# Patient Record
Sex: FEMALE | Race: WHITE | NOT HISPANIC OR LATINO | Employment: FULL TIME | URBAN - METROPOLITAN AREA
[De-identification: names, ages, dates, MRNs, and addresses within clinical notes are randomized per-mention and may not be internally consistent; named-entity substitution may affect disease eponyms.]

---

## 2021-11-09 PROCEDURE — U0004 COV-19 TEST NON-CDC HGH THRU: HCPCS | Performed by: NURSE PRACTITIONER

## 2023-09-22 PROBLEM — J40 BRONCHITIS: Status: ACTIVE | Noted: 2017-06-27

## 2025-01-30 ENCOUNTER — OFFICE VISIT (OUTPATIENT)
Dept: FAMILY MEDICINE CLINIC | Facility: CLINIC | Age: 60
End: 2025-01-30
Payer: COMMERCIAL

## 2025-01-30 VITALS
HEIGHT: 61 IN | OXYGEN SATURATION: 97 % | WEIGHT: 124 LBS | TEMPERATURE: 99.2 F | DIASTOLIC BLOOD PRESSURE: 73 MMHG | SYSTOLIC BLOOD PRESSURE: 108 MMHG | HEART RATE: 89 BPM | RESPIRATION RATE: 18 BRPM | BODY MASS INDEX: 23.41 KG/M2

## 2025-01-30 DIAGNOSIS — B00.9 HERPES SIMPLEX TYPE 2 INFECTION: ICD-10-CM

## 2025-01-30 DIAGNOSIS — F41.8 DEPRESSION WITH ANXIETY: ICD-10-CM

## 2025-01-30 DIAGNOSIS — B02.9 HERPES ZOSTER WITHOUT COMPLICATION: Primary | ICD-10-CM

## 2025-01-30 PROBLEM — Z78.0 OSTEOPENIA AFTER MENOPAUSE: Status: ACTIVE | Noted: 2025-01-30

## 2025-01-30 PROBLEM — C44.311 BASAL CELL CARCINOMA (BCC) OF SUPRATIP OF NOSE: Status: ACTIVE | Noted: 2025-01-30

## 2025-01-30 PROBLEM — M85.80 OSTEOPENIA AFTER MENOPAUSE: Status: ACTIVE | Noted: 2025-01-30

## 2025-01-30 PROCEDURE — 99214 OFFICE O/P EST MOD 30 MIN: CPT | Performed by: REGISTERED NURSE

## 2025-01-30 RX ORDER — ACYCLOVIR 800 MG/1
800 TABLET ORAL 3 TIMES DAILY
Qty: 21 TABLET | Refills: 0 | Status: SHIPPED | OUTPATIENT
Start: 2025-01-30

## 2025-01-30 RX ORDER — VALACYCLOVIR HYDROCHLORIDE 500 MG/1
500 TABLET, FILM COATED ORAL DAILY
COMMUNITY

## 2025-01-30 RX ORDER — ESCITALOPRAM OXALATE 20 MG/1
20 TABLET ORAL DAILY
Qty: 90 TABLET | Refills: 1 | Status: SHIPPED | OUTPATIENT
Start: 2025-01-30

## 2025-01-30 RX ORDER — HYDROXYZINE HYDROCHLORIDE 10 MG/1
10 TABLET, FILM COATED ORAL EVERY 8 HOURS PRN
Qty: 30 TABLET | Refills: 0 | Status: SHIPPED | OUTPATIENT
Start: 2025-01-30

## 2025-01-30 NOTE — PROGRESS NOTES
Subjective        Dianelys Goldman is a 59 y.o. female who presents to [facility].     Chief Complaint   Patient presents with    Rash     Under right armpit     Establish Care       Rash        Pt is new to me and new to this practice. Requesting me as PCP.  Today she also has a C/O rash on lateral right breast..    Rash - Started on 1/27 evening. It has stayed the same since then she says. She says it is painful and itches. She denies any drainage but says she believes there are blisters forming. She has never had anything like this before. Cortizone cream stops the itching but not the pain. Her bra rubs against it and makes it worse. Pain feels like burning. She did not notice the rash until it started itching 3 days ago.  Singles - Positive for low grade fever and herpetic rash.  Depression with anxiety - Chronic/stable. Pt denies any SI, HI, sleep disturbance, anxiety, emotional episodes.  HSV2 - Chronic/stable - She says she takes Valtrex when she feels a cold sore starting to form.    Dr. Gutierrez [facility] has ordered her DEXA scan and scripts in the past.    The following portions of the patient's history were reviewed and updated as appropriate: allergies, current medications, past family history, past medical history, past social history, past surgical history and problem list.    No Known Allergies       Current Outpatient Medications:     cyclobenzaprine (FLEXERIL) 10 MG tablet, Take 1 tablet by mouth At Night As Needed for Muscle Spasms (pain)., Disp: 15 tablet, Rfl: 0    escitalopram (LEXAPRO) 20 MG tablet, Take 1 tablet by mouth Daily., Disp: 90 tablet, Rfl: 1    valACYclovir (VALTREX) 500 MG tablet, Take 1 tablet by mouth Daily. (Patient taking differently: Take 1 tablet by mouth Daily. Take daily PRN when feeling a flare-up start.), Disp: , Rfl:     acyclovir (ZOVIRAX) 800 MG tablet, Take 1 tablet by mouth 3 (Three) Times a Day., Disp: 21 tablet, Rfl: 0    hydrOXYzine  "(ATARAX) 10 MG tablet, Take 1 tablet by mouth Every 8 (Eight) Hours As Needed for Itching., Disp: 30 tablet, Rfl: 0    Review of Systems   Skin:  Positive for rash.        Objective     /73 (BP Location: Left arm, Patient Position: Sitting, Cuff Size: Adult)   Pulse 89   Temp 99.2 °F (37.3 °C) (Oral)   Resp 18   Ht 154.9 cm (60.98\")   Wt 56.2 kg (124 lb)   SpO2 97%   BMI 23.44 kg/m²   BMI is within normal parameters. No other follow-up for BMI required.     Physical Exam  Vitals reviewed.   Constitutional:       General: She is not in acute distress.     Appearance: Normal appearance. She is normal weight. She is not ill-appearing.   Eyes:      Conjunctiva/sclera: Conjunctivae normal.   Cardiovascular:      Rate and Rhythm: Normal rate and regular rhythm.      Pulses: Normal pulses.      Heart sounds: Normal heart sounds.   Pulmonary:      Effort: Pulmonary effort is normal.      Breath sounds: Normal breath sounds.   Skin:     General: Skin is warm and dry.      Findings: Rash present. Rash is macular, papular and purpuric.             Comments: Erythematous macular area 5 cm x 3 cm with cluster of papules along dermatome. Skin is intact, no drainage noted.   Neurological:      General: No focal deficit present.      Mental Status: She is alert and oriented to person, place, and time.   Psychiatric:         Mood and Affect: Mood normal.         Behavior: Behavior normal.         Thought Content: Thought content normal.         Judgment: Judgment normal.         PHQ-2 Depression Screening  Little interest or pleasure in doing things? Not at all   Feeling down, depressed, or hopeless? Not at all   PHQ-2 Total Score 0         Result Review    The following data was reviewed by: MARKO Medina on 01/30/2025:               Assessment & Plan    Diagnoses and all orders for this visit:    1. Herpes zoster without complication (Primary)  -     acyclovir (ZOVIRAX) 800 MG tablet; Take 1 tablet by mouth 3 " (Three) Times a Day.  Dispense: 21 tablet; Refill: 0  -     hydrOXYzine (ATARAX) 10 MG tablet; Take 1 tablet by mouth Every 8 (Eight) Hours As Needed for Itching.  Dispense: 30 tablet; Refill: 0    2. Depression with anxiety  -     escitalopram (LEXAPRO) 20 MG tablet; Take 1 tablet by mouth Daily.  Dispense: 90 tablet; Refill: 1    3. Herpes simplex type 2 infection       Patient Instructions   Call your pharmacy for refills within 7 days of needing medication.     Do not take Valtrex while taking acyclovir.     Return in about 2 weeks for New Patient Physical.    Please call the office if you have any questions or concerns.    Thank you,  RAÚL Kim    Office number:   [phone]          Shingles, or herpes zoster, is an infection. It gives you a skin rash and blisters. These infected areas may hurt a lot.  Shingles only happens if:  You've had chickenpox.  You've been given a shot called a vaccine to protect you from getting chickenpox. Shingles is rare in this case.  What are the causes?  Shingles is caused by a germ called the varicella-zoster virus. This is the same germ that causes chickenpox. After you're exposed to the germ, it stays in your body but is dormant. This means it isn't active.  Shingles happens if the germ becomes active again. This can happen years after you're first exposed to the germ.  What increases the risk?  You may be more likely to get shingles if:  You're older than 60 years of age.  You're under a lot of stress.  You have a weak immune system. The immune system is your body's defense system. It may be weak if:  You have human immunodeficiency virus (HIV).  You have acquired immunodeficiency syndrome (AIDS).  You have cancer.  You take medicines that weaken your immune system. These include organ transplant medicines.  What are the signs or symptoms?  The first symptoms of shingles may be itching, tingling, or pain. Your skin may feel like it's burning.  A few days or  weeks later, you'll get a rash. Here's what you can expect:  The rash is likely to be on one side of your body.  The rash may be shaped like a belt or a band. Over time, it will turn into blisters filled with fluid.  The blisters will break open and change into scabs.  The scabs will dry up in about 2-3 weeks.  You may also have:  A fever.  Chills.  A headache.  Nausea.  How is this diagnosed?  Shingles is diagnosed with a skin exam. A sample called a culture may be taken from one of your blisters and sent to a lab. This will show if you have shingles.  How is this treated?  The rash may last for several weeks. There's no cure for shingles, but your health care provider may give you medicines. These medicines may:  Help with pain.  Help with itching.  Help with irritation and swelling.  Help you get better sooner.  Help to prevent long-term problems.  If the rash is on your face, you may need to see an eye doctor or an ear, nose, and throat (ENT) doctor.  Follow these instructions at home:  Medicines  Take your medicines only as told by your provider.  Put an anti-itch cream or numbing cream on the rash or blisters as told by your provider.  Relieving itching and discomfort    To help with itching:  Put cold, wet cloths called cold compresses on the rash or blisters.  Take a cool bath. Try adding baking soda or dry oatmeal to the water. Do not bathe in hot water.  Use calamine lotion on the rash or blisters. You can get this type of lotion at the store.  Blister and rash care  Keep your rash covered with a loose bandage.  Wear loose clothes that don't rub on your rash.  Take care of your rash as told by your provider. Make sure you:  Wash your hands with soap and water for at least 20 seconds before and after you change your bandage. If you can't use soap and water, use hand .  Keep your rash and blisters clean by washing them with mild soap and cool water.  Change your bandage.  Check your rash every day  for signs of infection. Check for:  More redness, swelling, or pain.  Fluid or blood.  Warmth.  Pus or a bad smell.  Do not scratch your rash. Do not pick at your blisters. To help you not scratch:  Keep your fingernails clean and cut short.  Try to wear gloves or mittens when you sleep.  General instructions  Rest.  Wash your hands often with soap and water for at least 20 seconds. If you can't use soap and water, use hand . Washing your hands lowers your chance of getting a skin infection.  Your infection can cause chickenpox in others. If you have blisters that aren't scabs yet, stay away from:  Babies.  Pregnant people.  Children who have eczema.  Older people who have organ transplants.  People who have a long-term, or chronic, illness.  Anyone who hasn't had chickenpox before.  Anyone who hasn't gotten the chickenpox vaccine.  How is this prevented?  Vaccines are the best way to prevent you from getting chickenpox or shingles. Talk with your provider about getting these shots.  Where to find more information  Centers for Disease Control and Prevention (CDC): cdc.gov  Contact a health care provider if:  Your pain doesn't get better with medicine.  Your pain doesn't get better after the rash heals.  You have any signs of infection around the rash.  Your rash or blisters get worse.  You have a fever or chills.  Get help right away if:  The rash is on your face or nose.  You have pain in your face or by your eye.  You lose feeling on one side of your face.  You have trouble seeing.  You have ear pain or ringing in your ear.  This information is not intended to replace advice given to you by your health care provider. Make sure you discuss any questions you have with your health care provider.  Document Revised: 09/19/2024 Document Reviewed: 02/02/2024  Elsevier Patient Education © 2024 [employer]       Nerve Pain After Shingles  Postherpetic neuralgia (PHN) is nerve pain you may get after you have  shingles. Shingles is an infection that causes a painful rash and blisters. It's caused by the same germ that causes chickenpox.  PHN affects the spot on your body where you had the shingles rash. It can last for 3 months after your rash has gone away.  What are the causes?  PHN may be caused by damage to your nerves. This damage may come from swelling from the shingles infection.  What increases the risk?  You may be more likely to get PHN if:  You're older than 60 years of age.  You have severe pain before your rash starts.  You have a very bad rash.  You have shingles in and around your eye.  Your body defense system (immune system) is weak.  What are the signs or symptoms?  The main symptom of PHN is pain. The pain may:  Be stabbing, burning, or shooting.  Feel like an electric shock.  Come and go, or it may be there all the time.  Get worse if:  Something touches your skin.  The temperature goes up or down.  You may also have itching.  How is this diagnosed?  PHN may be diagnosed based on:  Your symptoms.  Whether you've had shingles before.  How is this treated?  There's no cure, but treatment can help with the pain. Normal pain medicines may not help. You may need to work with an expert in treating pain to find what works best for you. Treatment may include:  Anti-seizure medicines.  Antidepressants.  Strong pain medicines.  A numbing patch worn on the skin.  Shots of:  Numbing medicines.  Medicines to treat inflammation.  Botulinum toxin. This can block pain signals and stop you from feeling pain.  Follow these instructions at home:  Medicines  Take over-the-counter and prescription medicines only as told by your health care provider.  Ask your provider if the medicine prescribed to you:  Requires you to avoid driving or using machinery.  Can cause trouble pooping or constipation. You may need to take these actions to prevent or treat trouble pooping:  Drink enough fluid to keep your pee (urine) pale  yellow.  Take over-the-counter or prescription medicines.  Eat foods that are high in fiber, such as beans, whole grains, and fresh fruits and vegetables.  Limit foods that are high in fat and processed sugars, such as fried or sweet foods.  Managing pain    If told, put ice on the painful area.  Put ice in a plastic bag.  Place a towel between your skin and the bag.  Leave the ice on for 20 minutes, 2-3 times a day.  If your skin turns bright red, remove the ice right away to prevent skin damage. The risk of damage is higher if you can't feel pain, heat, or cold.  Cover sensitive spots with a bandage, or dressing, to stop clothes from rubbing.  Wear loose clothes.  General instructions  It may take a long time for you to get better. Work closely with your provider.  Think about talking with a mental health care provider. They can help you find ways to cope with feeling overwhelmed or hopeless.  Have a good support system at home.  Think about joining a pain support group.  How is this prevented?  Vaccines are the best way to prevent shingles and PHN. You should get the vaccine shot for shingles once you're older than 50 years of age. Talk with your provider about getting the shot.  Contact a health care provider if:  Your medicine isn't helping.  You can't manage your pain at home.  You feel sad or depressed.  Get help right away if:  You have thoughts about hurting yourself or others.  Get help right away if you feel like you may hurt yourself or others, or have thoughts about taking your own life. Go to your nearest emergency room or:  Call 911.  Call the National Suicide Prevention Lifeline at 1-863.826.4370 or 703. This is open 24 hours a day.  Text the Crisis Text Line at 876194.  This information is not intended to replace advice given to you by your health care provider. Make sure you discuss any questions you have with your health care provider.  Document Revised: 03/21/2024 Document Reviewed:  03/21/2024  Elsevier Patient Education © 2024 [employer]         Follow Up   Return in about 2 weeks (around 2/13/2025) for Annual physical.    Patient was given instructions and counseling regarding her condition or for health maintenance advice. Please see specific information pulled into the AVS if appropriate.     Sangeetha Rose, APRN     01/30/25

## 2025-01-30 NOTE — PATIENT INSTRUCTIONS
Call your pharmacy for refills within 7 days of needing medication.     Do not take Valtrex while taking acyclovir.     Return in about 2 weeks for New Patient Physical.    Please call the office if you have any questions or concerns.    Thank you,  RAÚL Kim    Office number:   [phone]          Shingles, or herpes zoster, is an infection. It gives you a skin rash and blisters. These infected areas may hurt a lot.  Shingles only happens if:  You've had chickenpox.  You've been given a shot called a vaccine to protect you from getting chickenpox. Shingles is rare in this case.  What are the causes?  Shingles is caused by a germ called the varicella-zoster virus. This is the same germ that causes chickenpox. After you're exposed to the germ, it stays in your body but is dormant. This means it isn't active.  Shingles happens if the germ becomes active again. This can happen years after you're first exposed to the germ.  What increases the risk?  You may be more likely to get shingles if:  You're older than 60 years of age.  You're under a lot of stress.  You have a weak immune system. The immune system is your body's defense system. It may be weak if:  You have human immunodeficiency virus (HIV).  You have acquired immunodeficiency syndrome (AIDS).  You have cancer.  You take medicines that weaken your immune system. These include organ transplant medicines.  What are the signs or symptoms?  The first symptoms of shingles may be itching, tingling, or pain. Your skin may feel like it's burning.  A few days or weeks later, you'll get a rash. Here's what you can expect:  The rash is likely to be on one side of your body.  The rash may be shaped like a belt or a band. Over time, it will turn into blisters filled with fluid.  The blisters will break open and change into scabs.  The scabs will dry up in about 2-3 weeks.  You may also have:  A fever.  Chills.  A headache.  Nausea.  How is this  diagnosed?  Shingles is diagnosed with a skin exam. A sample called a culture may be taken from one of your blisters and sent to a lab. This will show if you have shingles.  How is this treated?  The rash may last for several weeks. There's no cure for shingles, but your health care provider may give you medicines. These medicines may:  Help with pain.  Help with itching.  Help with irritation and swelling.  Help you get better sooner.  Help to prevent long-term problems.  If the rash is on your face, you may need to see an eye doctor or an ear, nose, and throat (ENT) doctor.  Follow these instructions at home:  Medicines  Take your medicines only as told by your provider.  Put an anti-itch cream or numbing cream on the rash or blisters as told by your provider.  Relieving itching and discomfort    To help with itching:  Put cold, wet cloths called cold compresses on the rash or blisters.  Take a cool bath. Try adding baking soda or dry oatmeal to the water. Do not bathe in hot water.  Use calamine lotion on the rash or blisters. You can get this type of lotion at the store.  Blister and rash care  Keep your rash covered with a loose bandage.  Wear loose clothes that don't rub on your rash.  Take care of your rash as told by your provider. Make sure you:  Wash your hands with soap and water for at least 20 seconds before and after you change your bandage. If you can't use soap and water, use hand .  Keep your rash and blisters clean by washing them with mild soap and cool water.  Change your bandage.  Check your rash every day for signs of infection. Check for:  More redness, swelling, or pain.  Fluid or blood.  Warmth.  Pus or a bad smell.  Do not scratch your rash. Do not pick at your blisters. To help you not scratch:  Keep your fingernails clean and cut short.  Try to wear gloves or mittens when you sleep.  General instructions  Rest.  Wash your hands often with soap and water for at least 20 seconds.  If you can't use soap and water, use hand . Washing your hands lowers your chance of getting a skin infection.  Your infection can cause chickenpox in others. If you have blisters that aren't scabs yet, stay away from:  Babies.  Pregnant people.  Children who have eczema.  Older people who have organ transplants.  People who have a long-term, or chronic, illness.  Anyone who hasn't had chickenpox before.  Anyone who hasn't gotten the chickenpox vaccine.  How is this prevented?  Vaccines are the best way to prevent you from getting chickenpox or shingles. Talk with your provider about getting these shots.  Where to find more information  Centers for Disease Control and Prevention (CDC): cdc.gov  Contact a health care provider if:  Your pain doesn't get better with medicine.  Your pain doesn't get better after the rash heals.  You have any signs of infection around the rash.  Your rash or blisters get worse.  You have a fever or chills.  Get help right away if:  The rash is on your face or nose.  You have pain in your face or by your eye.  You lose feeling on one side of your face.  You have trouble seeing.  You have ear pain or ringing in your ear.  This information is not intended to replace advice given to you by your health care provider. Make sure you discuss any questions you have with your health care provider.  Document Revised: 09/19/2024 Document Reviewed: 02/02/2024  Elsevier Patient Education © 2024 [employer]       Nerve Pain After Shingles  Postherpetic neuralgia (PHN) is nerve pain you may get after you have shingles. Shingles is an infection that causes a painful rash and blisters. It's caused by the same germ that causes chickenpox.  PHN affects the spot on your body where you had the shingles rash. It can last for 3 months after your rash has gone away.  What are the causes?  PHN may be caused by damage to your nerves. This damage may come from swelling from the shingles infection.  What  increases the risk?  You may be more likely to get PHN if:  You're older than 60 years of age.  You have severe pain before your rash starts.  You have a very bad rash.  You have shingles in and around your eye.  Your body defense system (immune system) is weak.  What are the signs or symptoms?  The main symptom of PHN is pain. The pain may:  Be stabbing, burning, or shooting.  Feel like an electric shock.  Come and go, or it may be there all the time.  Get worse if:  Something touches your skin.  The temperature goes up or down.  You may also have itching.  How is this diagnosed?  PHN may be diagnosed based on:  Your symptoms.  Whether you've had shingles before.  How is this treated?  There's no cure, but treatment can help with the pain. Normal pain medicines may not help. You may need to work with an expert in treating pain to find what works best for you. Treatment may include:  Anti-seizure medicines.  Antidepressants.  Strong pain medicines.  A numbing patch worn on the skin.  Shots of:  Numbing medicines.  Medicines to treat inflammation.  Botulinum toxin. This can block pain signals and stop you from feeling pain.  Follow these instructions at home:  Medicines  Take over-the-counter and prescription medicines only as told by your health care provider.  Ask your provider if the medicine prescribed to you:  Requires you to avoid driving or using machinery.  Can cause trouble pooping or constipation. You may need to take these actions to prevent or treat trouble pooping:  Drink enough fluid to keep your pee (urine) pale yellow.  Take over-the-counter or prescription medicines.  Eat foods that are high in fiber, such as beans, whole grains, and fresh fruits and vegetables.  Limit foods that are high in fat and processed sugars, such as fried or sweet foods.  Managing pain    If told, put ice on the painful area.  Put ice in a plastic bag.  Place a towel between your skin and the bag.  Leave the ice on for 20  minutes, 2-3 times a day.  If your skin turns bright red, remove the ice right away to prevent skin damage. The risk of damage is higher if you can't feel pain, heat, or cold.  Cover sensitive spots with a bandage, or dressing, to stop clothes from rubbing.  Wear loose clothes.  General instructions  It may take a long time for you to get better. Work closely with your provider.  Think about talking with a mental health care provider. They can help you find ways to cope with feeling overwhelmed or hopeless.  Have a good support system at home.  Think about joining a pain support group.  How is this prevented?  Vaccines are the best way to prevent shingles and PHN. You should get the vaccine shot for shingles once you're older than 50 years of age. Talk with your provider about getting the shot.  Contact a health care provider if:  Your medicine isn't helping.  You can't manage your pain at home.  You feel sad or depressed.  Get help right away if:  You have thoughts about hurting yourself or others.  Get help right away if you feel like you may hurt yourself or others, or have thoughts about taking your own life. Go to your nearest emergency room or:  Call 911.  Call the National Suicide Prevention Lifeline at 1-981.270.2082 or 901. This is open 24 hours a day.  Text the Crisis Text Line at 269924.  This information is not intended to replace advice given to you by your health care provider. Make sure you discuss any questions you have with your health care provider.  Document Revised: 03/21/2024 Document Reviewed: 03/21/2024  Elsevier Patient Education © 2024 [employer]

## 2025-02-14 ENCOUNTER — OFFICE VISIT (OUTPATIENT)
Dept: FAMILY MEDICINE CLINIC | Facility: CLINIC | Age: 60
End: 2025-02-14
Payer: COMMERCIAL

## 2025-02-14 VITALS
BODY MASS INDEX: 23.94 KG/M2 | HEIGHT: 61 IN | DIASTOLIC BLOOD PRESSURE: 73 MMHG | RESPIRATION RATE: 18 BRPM | OXYGEN SATURATION: 97 % | SYSTOLIC BLOOD PRESSURE: 109 MMHG | TEMPERATURE: 98.9 F | WEIGHT: 126.8 LBS | HEART RATE: 96 BPM

## 2025-02-14 DIAGNOSIS — Z00.00 ENCOUNTER FOR ANNUAL PHYSICAL EXAMINATION EXCLUDING GYNECOLOGICAL EXAMINATION IN A PATIENT OLDER THAN 17 YEARS: Primary | ICD-10-CM

## 2025-02-14 DIAGNOSIS — Z13.6 SCREENING FOR ISCHEMIC HEART DISEASE (IHD): ICD-10-CM

## 2025-02-14 DIAGNOSIS — H61.22 IMPACTED CERUMEN OF LEFT EAR: ICD-10-CM

## 2025-02-14 DIAGNOSIS — B00.9 HERPES SIMPLEX TYPE 2 INFECTION: ICD-10-CM

## 2025-02-14 DIAGNOSIS — Z13.6 SCREENING FOR CARDIOVASCULAR CONDITION: ICD-10-CM

## 2025-02-14 DIAGNOSIS — Z12.31 ENCOUNTER FOR SCREENING MAMMOGRAM FOR MALIGNANT NEOPLASM OF BREAST: ICD-10-CM

## 2025-02-14 DIAGNOSIS — Z13.1 SCREENING FOR DIABETES MELLITUS (DM): ICD-10-CM

## 2025-02-14 DIAGNOSIS — R06.83 HABITUAL SNORING: ICD-10-CM

## 2025-02-14 DIAGNOSIS — Z12.11 SCREENING FOR COLON CANCER: ICD-10-CM

## 2025-02-14 DIAGNOSIS — R53.82 CHRONIC FATIGUE: ICD-10-CM

## 2025-02-14 DIAGNOSIS — Z13.6 HYPERTENSION SCREEN: ICD-10-CM

## 2025-02-14 RX ORDER — VALACYCLOVIR HYDROCHLORIDE 500 MG/1
500 TABLET, FILM COATED ORAL DAILY
Qty: 30 TABLET | Refills: 2 | Status: SHIPPED | OUTPATIENT
Start: 2025-02-14

## 2025-02-14 NOTE — PROGRESS NOTES
"Subjective        Dianelys Goldman is a 59 y.o. female who presents to [facility].     Chief Complaint   Patient presents with    Annual Exam       History of Present Illness  Pt is here for Annual PE.     Annual PE - Age appropriate screenings ordered if needed.  Fatigue - Started 6 months ago. She snores at night. She does not feel rested after sleeping all night. \"Sometimes, but not very often\" she wakes up to go to the bathroom. She has difficulty falling sleep, \"I toss and turn until I get settled\".   Impacted cerumen left ear -      The following portions of the patient's history were reviewed and updated as appropriate: allergies, current medications, past family history, past medical history, past social history, past surgical history and problem list.    No Known Allergies       Current Outpatient Medications:     cyclobenzaprine (FLEXERIL) 10 MG tablet, Take 1 tablet by mouth At Night As Needed for Muscle Spasms (pain)., Disp: 15 tablet, Rfl: 0    escitalopram (LEXAPRO) 20 MG tablet, Take 1 tablet by mouth Daily., Disp: 90 tablet, Rfl: 1    hydrOXYzine (ATARAX) 10 MG tablet, Take 1 tablet by mouth Every 8 (Eight) Hours As Needed for Itching., Disp: 30 tablet, Rfl: 0    valACYclovir (VALTREX) 500 MG tablet, Take 1 tablet by mouth Daily. Take daily PRN when feeling a flare-up start., Disp: 30 tablet, Rfl: 2    Review of Systems     Objective     /73 (BP Location: Left arm, Patient Position: Sitting, Cuff Size: Adult)   Pulse 96   Temp 98.9 °F (37.2 °C) (Oral)   Resp 18   Ht 154.9 cm (60.98\")   Wt 57.5 kg (126 lb 12.8 oz)   SpO2 97%   BMI 23.97 kg/m²     BMI is within normal parameters. No other follow-up for BMI required.     Physical Exam    PHQ-2 Depression Screening  Little interest or pleasure in doing things?     Feeling down, depressed, or hopeless?     PHQ-2 Total Score           Result Review               Ear Cerumen Removal    Date/Time: 2/14/2025 3:30 " PM    Performed by: Sanegetha Rose APRN  Authorized by: Sangeetha Rose APRN    Anesthesia:  Local Anesthetic: none  Location details: left ear  Patient tolerance: patient tolerated the procedure well with no immediate complications  Comments: Cerumen removed in one piece without difficulty.  Procedure type: instrumentation, irrigation   Sedation:  Patient sedated: no           Assessment & Plan    Diagnoses and all orders for this visit:    1. Encounter for annual physical examination excluding gynecological examination in a patient older than 17 years (Primary)  -     Hemoglobin A1c; Future  -     TSH Rfx On Abnormal To Free T4; Future  -     Lipid Panel; Future  -     Comprehensive Metabolic Panel; Future  -     CBC & Differential; Future    2. Screening for diabetes mellitus (DM)  -     Hemoglobin A1c; Future    3. Encounter for screening mammogram for malignant neoplasm of breast  -     Mammo Screening Digital Tomosynthesis Bilateral With CAD; Future    4. Hypertension screen  -     Lipid Panel; Future    5. Screening for cardiovascular condition  -     TSH Rfx On Abnormal To Free T4; Future  -     Lipid Panel; Future  -     Comprehensive Metabolic Panel; Future  -     CBC & Differential; Future    6. Screening for ischemic heart disease (IHD)  -     TSH Rfx On Abnormal To Free T4; Future  -     Lipid Panel; Future  -     Comprehensive Metabolic Panel; Future  -     CBC & Differential; Future    7. Habitual snoring  -     Ambulatory Referral to Sleep Medicine    8. Chronic fatigue  -     Ambulatory Referral to Sleep Medicine    9. Impacted cerumen of left ear  -     Ear Cerumen Removal    10. Screening for colon cancer  -     Cologuard - Stool, Per Rectum; Future    11. Herpes simplex type 2 infection  -     valACYclovir (VALTREX) 500 MG tablet; Take 1 tablet by mouth Daily. Take daily PRN when feeling a flare-up start.  Dispense: 30 tablet; Refill: 2       Patient Instructions   Call your pharmacy for refills  within 7 days of needing medication. Call the office if you have not been contacted within 7 days for referral appointment.     Call office for lab results if you have not received a call from our office or [employer] message in 1-2 days.      Please call the office if you have any questions or concerns.    Thank you,  Sangeetha Rose, MARKO-C    Office number:   [phone]         Follow Up   Return in about 6 months (around 8/14/2025) for Next scheduled follow up.    Patient was given instructions and counseling regarding her condition or for health maintenance advice. Please see specific information pulled into the AVS if appropriate.     Sangeetha Rose, MARKO     02/14/25

## 2025-02-14 NOTE — PATIENT INSTRUCTIONS
Call your pharmacy for refills within 7 days of needing medication. Call the office if you have not been contacted within 7 days for referral appointment.     Call office for lab results if you have not received a call from our office or [employer] message in 1-2 days.      Please call the office if you have any questions or concerns.    Thank you,  RAÚL Kim    Office number:   [phone]

## 2025-03-06 ENCOUNTER — TELEPHONE (OUTPATIENT)
Dept: FAMILY MEDICINE CLINIC | Facility: CLINIC | Age: 60
End: 2025-03-06
Payer: COMMERCIAL

## 2025-03-06 NOTE — TELEPHONE ENCOUNTER
Hub ok to relay   Called patient but unable to left voicemail due to voicemail not setup yet. Called to remind patient need to complete her labs and Mammo that Sangeetha ordered then call office for results.

## 2025-05-14 ENCOUNTER — TELEPHONE (OUTPATIENT)
Dept: FAMILY MEDICINE CLINIC | Facility: CLINIC | Age: 60
End: 2025-05-14
Payer: COMMERCIAL

## 2025-05-14 NOTE — TELEPHONE ENCOUNTER
Called patient to follow up on labs and Mammo that Sangeetha ordered. She said she will gets it done soon.

## 2025-07-25 DIAGNOSIS — F41.8 DEPRESSION WITH ANXIETY: ICD-10-CM

## 2025-07-28 RX ORDER — ESCITALOPRAM OXALATE 20 MG/1
20 TABLET ORAL DAILY
Qty: 90 TABLET | Refills: 0 | Status: SHIPPED | OUTPATIENT
Start: 2025-07-28